# Patient Record
Sex: FEMALE | Race: WHITE | HISPANIC OR LATINO | Employment: UNEMPLOYED | URBAN - METROPOLITAN AREA
[De-identification: names, ages, dates, MRNs, and addresses within clinical notes are randomized per-mention and may not be internally consistent; named-entity substitution may affect disease eponyms.]

---

## 2019-07-08 ENCOUNTER — OFFICE VISIT (OUTPATIENT)
Dept: INTERNAL MEDICINE | Facility: CLINIC | Age: 24
End: 2019-07-08

## 2019-07-08 VITALS
OXYGEN SATURATION: 98 % | HEART RATE: 88 BPM | SYSTOLIC BLOOD PRESSURE: 110 MMHG | HEIGHT: 63 IN | BODY MASS INDEX: 17.05 KG/M2 | DIASTOLIC BLOOD PRESSURE: 78 MMHG | TEMPERATURE: 98.3 F | RESPIRATION RATE: 18 BRPM | WEIGHT: 96.25 LBS

## 2019-07-08 DIAGNOSIS — R63.4 WEIGHT LOSS: ICD-10-CM

## 2019-07-08 DIAGNOSIS — Z13.220 LIPID SCREENING: ICD-10-CM

## 2019-07-08 DIAGNOSIS — Z13.29 THYROID DISORDER SCREENING: ICD-10-CM

## 2019-07-08 DIAGNOSIS — Z13.0 SCREENING FOR BLOOD DISEASE: ICD-10-CM

## 2019-07-08 DIAGNOSIS — Z13.21 ENCOUNTER FOR VITAMIN DEFICIENCY SCREENING: ICD-10-CM

## 2019-07-08 DIAGNOSIS — R63.0 LOSS OF APPETITE: Primary | ICD-10-CM

## 2019-07-08 DIAGNOSIS — E63.9 POOR DIET: ICD-10-CM

## 2019-07-08 PROCEDURE — 99204 OFFICE O/P NEW MOD 45 MIN: CPT | Performed by: NURSE PRACTITIONER

## 2019-07-08 RX ORDER — NEOMYCIN/POLYMYXIN B/PRAMOXINE 3.5-10K-1
1 CREAM (GRAM) TOPICAL DAILY
Qty: 30 TABLET | Refills: 11 | Status: SHIPPED | OUTPATIENT
Start: 2019-07-08 | End: 2020-01-29

## 2019-07-08 NOTE — PROGRESS NOTES
Subjective   Chief Complaint   Patient presents with   • Establish Care   • underweight      Erika Grimaldo is a 24 y.o. female here today to establish care. She has always been underweight and has difficult time maintaining. She's recently had an unexpected weight loss. She has lost her appetite and is only eating about one meal daily.  This meal typically consists of meat and carbs.  She does not like any fruits or vegetables.  This happened to her several years ago and she started supplementing with meal replacements like Ensure.  Her appetite picked back up after several months.  She has a 1-year-old child and had blood work done when she was pregnant.  She was not told that any of this blood work was abnormal.  She is physically active but does not exercise on a daily basis.  She denies feeling fatigued.  She denies any recent stressors, anxiety, depression, or insomnia. Denies any shortness of air, heart palpitations, or chest pain.    The following portions of the patient's history were reviewed and updated as appropriate: allergies, current medications, past family history, past medical history, past social history, past surgical history and problem list.    Review of Systems   Constitutional: Positive for appetite change and unexpected weight loss. Negative for activity change, chills, diaphoresis, fatigue, fever and unexpected weight gain.   HENT: Negative.  Negative for ear discharge, ear pain, mouth sores, nosebleeds, sinus pressure, sneezing and sore throat.    Eyes: Negative.  Negative for pain, discharge and itching.   Respiratory: Negative.  Negative for cough, chest tightness, shortness of breath and wheezing.    Cardiovascular: Negative.  Negative for chest pain, palpitations and leg swelling.   Gastrointestinal: Negative.  Negative for abdominal pain, constipation, diarrhea, nausea and vomiting.   Endocrine: Negative.  Negative for heat intolerance, polydipsia and polyphagia.  "  Genitourinary: Negative.  Negative for dysuria, flank pain, frequency, hematuria and urgency.   Musculoskeletal: Negative.  Negative for arthralgias, back pain, gait problem, joint swelling, myalgias, neck pain and neck stiffness.   Skin: Negative.  Negative for color change, pallor and rash.   Allergic/Immunologic: Negative.  Negative for immunocompromised state.   Neurological: Negative.  Negative for seizures, speech difficulty, weakness and numbness.   Hematological: Negative.  Negative for adenopathy.   Psychiatric/Behavioral: Negative.  Negative for agitation, decreased concentration, sleep disturbance and depressed mood. The patient is not nervous/anxious.        Current Outpatient Medications on File Prior to Visit   Medication Sig   • [DISCONTINUED] azithromycin (ZITHROMAX) 250 MG tablet Take 1 tablet by mouth Daily. Take 2 tablets the first day, then 1 tablet daily for 4 days.   • [DISCONTINUED] fluticasone (FLONASE) 50 MCG/ACT nasal spray 2 sprays into each nostril Daily.   • [DISCONTINUED] levocetirizine (XYZAL) 5 MG tablet Take 1 tablet by mouth Every Evening.   • [DISCONTINUED] Prenatal Vit-Fe Fumarate-FA (PRENATAL VITAMIN PO) Take  by mouth.     No current facility-administered medications on file prior to visit.        Objective   Vitals:    07/08/19 1027   BP: 110/78   BP Location: Left arm   Patient Position: Sitting   Cuff Size: Adult   Pulse: 88   Resp: 18   Temp: 98.3 °F (36.8 °C)   TempSrc: Temporal   SpO2: 98%   Weight: 43.7 kg (96 lb 4 oz)   Height: 160 cm (63\")   PainSc: 0-No pain     Body mass index is 17.05 kg/m².    Physical Exam   Constitutional: She is oriented to person, place, and time. She appears well-developed and well-nourished.   HENT:   Head: Normocephalic and atraumatic.   Eyes: EOM are normal. Pupils are equal, round, and reactive to light.   Neck: Normal range of motion.   Cardiovascular: Normal rate, regular rhythm and normal heart sounds.   Pulmonary/Chest: Effort normal " and breath sounds normal.   Abdominal: Soft. Bowel sounds are normal.   Musculoskeletal: Normal range of motion.   Neurological: She is alert and oriented to person, place, and time.   Skin: Skin is warm and dry.   Psychiatric: She has a normal mood and affect. Her behavior is normal.   Vitals reviewed.      Assessment/Plan   Erika was seen today for establish care and underweight.    Diagnoses and all orders for this visit:    Loss of appetite   Start Ensure shakes twice daily along with a least one well balanced meal.   -     CBC Auto Differential; Future  -     Comprehensive Metabolic Panel; Future  -     Lipid Panel; Future  -     Vitamin D 25 Hydroxy; Future  -     Vitamin B12 & Folate; Future  -     TSH Rfx On Abnormal To Free T4; Future  -     Hemoglobin A1c; Future  -     Multiple Vitamins-Minerals (MULTI-VITAMIN GUMMIES) chewable tablet; Chew 1 tablet Daily - start    Weight loss   Keep food log of daily meals and what food she is eating.   -     CBC Auto Differential; Future  -     Comprehensive Metabolic Panel; Future  -     Lipid Panel; Future  -     Vitamin D 25 Hydroxy; Future  -     Vitamin B12 & Folate; Future  -     TSH Rfx On Abnormal To Free T4; Future  -     Hemoglobin A1c; Future  -     Multiple Vitamins-Minerals (MULTI-VITAMIN GUMMIES) chewable tablet; Chew 1 tablet Daily - start      Poor diet  -     CBC Auto Differential; Future  -     Comprehensive Metabolic Panel; Future  -     Vitamin D 25 Hydroxy; Future  -     Vitamin B12 & Folate; Future  -     TSH Rfx On Abnormal To Free T4; Future  -     Hemoglobin A1c; Future  -     Multiple Vitamins-Minerals (MULTI-VITAMIN GUMMIES) chewable tablet; Chew 1 tablet Daily - start      Encounter for vitamin deficiency screening  -     Vitamin D 25 Hydroxy; Future  -     Vitamin B12 & Folate; Future    Lipid screening  -     Lipid Panel; Future    Screening for blood disease  -     CBC Auto Differential; Future  -     Comprehensive Metabolic Panel;  Future  -     Lipid Panel; Future  -     Vitamin D 25 Hydroxy; Future  -     Vitamin B12 & Folate; Future  -     TSH Rfx On Abnormal To Free T4; Future  -     Hemoglobin A1c; Future    Thyroid disorder screening  -     TSH Rfx On Abnormal To Free T4; Future           Current Outpatient Medications:   •  Multiple Vitamins-Minerals (MULTI-VITAMIN GUMMIES) chewable tablet, Chew 1 tablet Daily., Disp: 30 tablet, Rfl: 11       Plan of care reviewed with the patient at the conclusion of today's visit.  Education was provided regarding diagnosis, management, and any prescribed or recommended OTC medications.  Patient verbalized understanding of and agreement with management plan.     Return if symptoms worsen or fail to improve.      Carly Pritchett, APRN

## 2020-10-13 PROCEDURE — 87661 TRICHOMONAS VAGINALIS AMPLIF: CPT | Performed by: FAMILY MEDICINE

## 2020-10-13 PROCEDURE — 87591 N.GONORRHOEAE DNA AMP PROB: CPT | Performed by: FAMILY MEDICINE

## 2020-10-13 PROCEDURE — 87086 URINE CULTURE/COLONY COUNT: CPT | Performed by: FAMILY MEDICINE

## 2020-10-13 PROCEDURE — 87491 CHLMYD TRACH DNA AMP PROBE: CPT | Performed by: FAMILY MEDICINE

## 2020-10-17 ENCOUNTER — TELEPHONE (OUTPATIENT)
Dept: URGENT CARE | Facility: CLINIC | Age: 25
End: 2020-10-17

## 2021-02-19 PROCEDURE — 87147 CULTURE TYPE IMMUNOLOGIC: CPT | Performed by: NURSE PRACTITIONER

## 2021-02-19 PROCEDURE — 87086 URINE CULTURE/COLONY COUNT: CPT | Performed by: NURSE PRACTITIONER

## 2022-09-08 ENCOUNTER — TRANSCRIBE ORDERS (OUTPATIENT)
Dept: OBSTETRICS AND GYNECOLOGY | Facility: HOSPITAL | Age: 27
End: 2022-09-08

## 2022-09-08 DIAGNOSIS — O41.8X10 SUBCHORIONIC HEMORRHAGE OF PLACENTA IN FIRST TRIMESTER, SINGLE OR UNSPECIFIED FETUS: ICD-10-CM

## 2022-09-08 DIAGNOSIS — Z34.90 PREGNANCY, UNSPECIFIED GESTATIONAL AGE: ICD-10-CM

## 2022-09-08 DIAGNOSIS — Z36.3 ANTENATAL SCREENING FOR MALFORMATION USING ULTRASONICS: Primary | ICD-10-CM

## 2022-09-08 DIAGNOSIS — O46.8X1 SUBCHORIONIC HEMORRHAGE OF PLACENTA IN FIRST TRIMESTER, SINGLE OR UNSPECIFIED FETUS: ICD-10-CM

## 2022-09-26 ENCOUNTER — APPOINTMENT (OUTPATIENT)
Dept: WOMENS IMAGING | Facility: HOSPITAL | Age: 27
End: 2022-09-26

## 2022-09-28 ENCOUNTER — HOSPITAL ENCOUNTER (OUTPATIENT)
Dept: WOMENS IMAGING | Facility: HOSPITAL | Age: 27
Discharge: HOME | End: 2022-09-28
Admitting: OBSTETRICS & GYNECOLOGY

## 2022-09-28 ENCOUNTER — OFFICE VISIT (OUTPATIENT)
Dept: OBSTETRICS AND GYNECOLOGY | Facility: HOSPITAL | Age: 27
End: 2022-09-28

## 2022-09-28 VITALS — WEIGHT: 106.4 LBS | DIASTOLIC BLOOD PRESSURE: 63 MMHG | BODY MASS INDEX: 18.85 KG/M2 | SYSTOLIC BLOOD PRESSURE: 98 MMHG

## 2022-09-28 DIAGNOSIS — O46.8X1 SUBCHORIONIC HEMORRHAGE OF PLACENTA IN FIRST TRIMESTER, SINGLE OR UNSPECIFIED FETUS: ICD-10-CM

## 2022-09-28 DIAGNOSIS — O41.8X10 SUBCHORIONIC HEMORRHAGE OF PLACENTA IN FIRST TRIMESTER, SINGLE OR UNSPECIFIED FETUS: ICD-10-CM

## 2022-09-28 DIAGNOSIS — Z36.3 ANTENATAL SCREENING FOR MALFORMATION USING ULTRASONICS: ICD-10-CM

## 2022-09-28 DIAGNOSIS — Z3A.18 18 WEEKS GESTATION OF PREGNANCY: Primary | ICD-10-CM

## 2022-09-28 DIAGNOSIS — Z34.90 PREGNANCY, UNSPECIFIED GESTATIONAL AGE: ICD-10-CM

## 2022-09-28 PROBLEM — O20.8 SUBCHORIONIC HEMORRHAGE IN FIRST TRIMESTER: Status: ACTIVE | Noted: 2022-09-28

## 2022-09-28 PROCEDURE — 76805 OB US >/= 14 WKS SNGL FETUS: CPT

## 2022-09-28 PROCEDURE — 76805 OB US >/= 14 WKS SNGL FETUS: CPT | Performed by: OBSTETRICS & GYNECOLOGY

## 2022-09-28 NOTE — PROGRESS NOTES
Patient seen in M[facility] Fetal Medicine clinic today. Please see full note in under imaging tab of patient chart in [facility] ([facility] report).    Samantha Lamb MD

## 2022-11-09 ENCOUNTER — APPOINTMENT (OUTPATIENT)
Dept: WOMENS IMAGING | Facility: HOSPITAL | Age: 27
End: 2022-11-09

## 2022-11-14 ENCOUNTER — HOSPITAL ENCOUNTER (OUTPATIENT)
Dept: WOMENS IMAGING | Facility: HOSPITAL | Age: 27
End: 2022-11-14

## 2024-11-20 ENCOUNTER — PATIENT ROUNDING (BHMG ONLY) (OUTPATIENT)
Dept: URGENT CARE | Facility: CLINIC | Age: 29
End: 2024-11-20
Payer: MEDICAID